# Patient Record
Sex: FEMALE | Race: WHITE | NOT HISPANIC OR LATINO | URBAN - METROPOLITAN AREA
[De-identification: names, ages, dates, MRNs, and addresses within clinical notes are randomized per-mention and may not be internally consistent; named-entity substitution may affect disease eponyms.]

---

## 2024-06-12 ENCOUNTER — TELEPHONE (OUTPATIENT)
Dept: GASTROENTEROLOGY | Facility: CLINIC | Age: 49
End: 2024-06-12

## 2024-06-12 NOTE — TELEPHONE ENCOUNTER
Pt is due for a colonoscopy with Dr Young for hx of colonic polyps.  Called pt, however, phone number is no longer in service. Recall letter mailed to pt.